# Patient Record
Sex: FEMALE | Race: WHITE
[De-identification: names, ages, dates, MRNs, and addresses within clinical notes are randomized per-mention and may not be internally consistent; named-entity substitution may affect disease eponyms.]

---

## 2018-07-24 ENCOUNTER — HOSPITAL ENCOUNTER (OUTPATIENT)
Dept: HOSPITAL 8 - LDOP | Age: 23
Discharge: HOME | End: 2018-07-24
Attending: OBSTETRICS & GYNECOLOGY
Payer: MEDICAID

## 2018-07-24 VITALS — WEIGHT: 207.23 LBS | HEIGHT: 63 IN | BODY MASS INDEX: 36.72 KG/M2

## 2018-07-24 DIAGNOSIS — Z34.83: Primary | ICD-10-CM

## 2018-07-24 DIAGNOSIS — O30.043: Primary | ICD-10-CM

## 2018-07-24 DIAGNOSIS — O23.43: ICD-10-CM

## 2018-07-24 DIAGNOSIS — Z3A.30: ICD-10-CM

## 2018-07-24 LAB
CLUE CELLS: PRESENT
T VAGINALIS RRNA GENITAL QL PROBE: (no result)
WET PREP WBCS: (no result)

## 2018-07-24 PROCEDURE — 87808 TRICHOMONAS ASSAY W/OPTIC: CPT

## 2018-07-24 PROCEDURE — 99211 OFF/OP EST MAY X REQ PHY/QHP: CPT

## 2018-07-24 PROCEDURE — 59025 FETAL NON-STRESS TEST: CPT

## 2018-07-24 PROCEDURE — 87210 SMEAR WET MOUNT SALINE/INK: CPT

## 2018-07-24 PROCEDURE — 80307 DRUG TEST PRSMV CHEM ANLYZR: CPT

## 2018-07-24 PROCEDURE — G0463 HOSPITAL OUTPT CLINIC VISIT: HCPCS

## 2018-07-24 PROCEDURE — 99201: CPT

## 2018-08-11 ENCOUNTER — HOSPITAL ENCOUNTER (INPATIENT)
Dept: HOSPITAL 8 - LDOP | Age: 23
LOS: 1 days | Discharge: HOME | DRG: 781 | End: 2018-08-12
Attending: OBSTETRICS & GYNECOLOGY | Admitting: OBSTETRICS & GYNECOLOGY
Payer: MEDICAID

## 2018-08-11 VITALS — BODY MASS INDEX: 36.93 KG/M2 | WEIGHT: 208.43 LBS | HEIGHT: 63 IN

## 2018-08-11 VITALS — DIASTOLIC BLOOD PRESSURE: 71 MMHG | SYSTOLIC BLOOD PRESSURE: 125 MMHG

## 2018-08-11 DIAGNOSIS — O30.043: ICD-10-CM

## 2018-08-11 DIAGNOSIS — O12.03: ICD-10-CM

## 2018-08-11 DIAGNOSIS — O99.820: Primary | ICD-10-CM

## 2018-08-11 DIAGNOSIS — Z3A.32: ICD-10-CM

## 2018-08-11 LAB
ALBUMIN SERPL-MCNC: 2.5 G/DL (ref 3.4–5)
ALP SERPL-CCNC: 331 U/L (ref 45–117)
ALT SERPL-CCNC: 15 U/L (ref 12–78)
ANION GAP SERPL CALC-SCNC: 9 MMOL/L (ref 5–15)
BASOPHILS # BLD AUTO: 0.14 X10^3/UL (ref 0–0.1)
BASOPHILS NFR BLD AUTO: 1 % (ref 0–1)
BILIRUB SERPL-MCNC: 0.4 MG/DL (ref 0.2–1)
CALCIUM SERPL-MCNC: 8.9 MG/DL (ref 8.5–10.1)
CHLORIDE SERPL-SCNC: 110 MMOL/L (ref 98–107)
CREAT SERPL-MCNC: 0.37 MG/DL (ref 0.55–1.02)
EOSINOPHIL # BLD AUTO: 0.07 X10^3/UL (ref 0–0.4)
EOSINOPHIL NFR BLD AUTO: 0 % (ref 1–7)
ERYTHROCYTE [DISTWIDTH] IN BLOOD BY AUTOMATED COUNT: 13.5 % (ref 9.6–15.2)
HBV CORE IGM SERPL QL IA: 8 MG/DL (ref 0–12)
HBV SURFACE AB SER RIA-ACNC: 41 MG/DL
LYMPHOCYTES # BLD AUTO: 2.16 X10^3/UL (ref 1–3.4)
LYMPHOCYTES NFR BLD AUTO: 14 % (ref 22–44)
MCH RBC QN AUTO: 32.9 PG (ref 27–34.8)
MCHC RBC AUTO-ENTMCNC: 33.8 G/DL (ref 32.4–35.8)
MCV RBC AUTO: 97.3 FL (ref 80–100)
MD: NO
MICROSCOPIC: (no result)
MONOCYTES # BLD AUTO: 1.03 X10^3/UL (ref 0.2–0.8)
MONOCYTES NFR BLD AUTO: 7 % (ref 2–9)
NEUTROPHILS # BLD AUTO: 11.67 X10^3/UL (ref 1.8–6.8)
NEUTROPHILS NFR BLD AUTO: 78 % (ref 42–75)
PLATELET # BLD AUTO: 220 X10^3/UL (ref 130–400)
PMV BLD AUTO: 8.2 FL (ref 7.4–10.4)
PROT SERPL-MCNC: 6.1 G/DL (ref 6.4–8.2)
RBC # BLD AUTO: 3.78 X10^6/UL (ref 3.82–5.3)

## 2018-08-11 PROCEDURE — 82248 BILIRUBIN DIRECT: CPT

## 2018-08-11 PROCEDURE — 86900 BLOOD TYPING SEROLOGIC ABO: CPT

## 2018-08-11 PROCEDURE — 82731 ASSAY OF FETAL FIBRONECTIN: CPT

## 2018-08-11 PROCEDURE — 80307 DRUG TEST PRSMV CHEM ANLYZR: CPT

## 2018-08-11 PROCEDURE — 86850 RBC ANTIBODY SCREEN: CPT

## 2018-08-11 PROCEDURE — 84550 ASSAY OF BLOOD/URIC ACID: CPT

## 2018-08-11 PROCEDURE — 82570 ASSAY OF URINE CREATININE: CPT

## 2018-08-11 PROCEDURE — 36415 COLL VENOUS BLD VENIPUNCTURE: CPT

## 2018-08-11 PROCEDURE — 81003 URINALYSIS AUTO W/O SCOPE: CPT

## 2018-08-11 PROCEDURE — 85025 COMPLETE CBC W/AUTO DIFF WBC: CPT

## 2018-08-11 PROCEDURE — 84156 ASSAY OF PROTEIN URINE: CPT

## 2018-08-11 PROCEDURE — 87081 CULTURE SCREEN ONLY: CPT

## 2018-08-11 PROCEDURE — 87086 URINE CULTURE/COLONY COUNT: CPT

## 2018-08-11 PROCEDURE — 76815 OB US LIMITED FETUS(S): CPT

## 2018-08-11 PROCEDURE — 87147 CULTURE TYPE IMMUNOLOGIC: CPT

## 2018-08-11 PROCEDURE — 80053 COMPREHEN METABOLIC PANEL: CPT

## 2018-08-11 RX ADMIN — BETAMETHASONE SODIUM PHOSPHATE AND BETAMETHASONE ACETATE SCH MG: 3; 3 INJECTION, SUSPENSION INTRA-ARTICULAR; INTRALESIONAL; INTRAMUSCULAR at 15:13

## 2018-08-12 RX ADMIN — BETAMETHASONE SODIUM PHOSPHATE AND BETAMETHASONE ACETATE SCH MG: 3; 3 INJECTION, SUSPENSION INTRA-ARTICULAR; INTRALESIONAL; INTRAMUSCULAR at 14:42

## 2018-08-30 ENCOUNTER — HOSPITAL ENCOUNTER (OUTPATIENT)
Dept: HOSPITAL 8 - LDIP | Age: 23
Setting detail: OBSERVATION
Discharge: HOME | End: 2018-08-30
Attending: OBSTETRICS & GYNECOLOGY | Admitting: OBSTETRICS & GYNECOLOGY
Payer: MEDICAID

## 2018-08-30 VITALS — BODY MASS INDEX: 37.5 KG/M2 | HEIGHT: 63 IN | WEIGHT: 211.64 LBS

## 2018-08-30 VITALS — SYSTOLIC BLOOD PRESSURE: 123 MMHG | DIASTOLIC BLOOD PRESSURE: 70 MMHG

## 2018-08-30 DIAGNOSIS — Z3A.35: ICD-10-CM

## 2018-08-30 DIAGNOSIS — F12.90: ICD-10-CM

## 2018-08-30 DIAGNOSIS — O99.323: Primary | ICD-10-CM

## 2018-08-30 DIAGNOSIS — Z79.899: ICD-10-CM

## 2018-08-30 PROCEDURE — G0378 HOSPITAL OBSERVATION PER HR: HCPCS

## 2018-08-30 PROCEDURE — 99211 OFF/OP EST MAY X REQ PHY/QHP: CPT

## 2018-08-30 PROCEDURE — 59025 FETAL NON-STRESS TEST: CPT

## 2018-08-30 PROCEDURE — 80307 DRUG TEST PRSMV CHEM ANLYZR: CPT

## 2018-08-30 PROCEDURE — G0463 HOSPITAL OUTPT CLINIC VISIT: HCPCS

## 2018-08-30 PROCEDURE — 76819 FETAL BIOPHYS PROFIL W/O NST: CPT

## 2018-09-13 ENCOUNTER — HOSPITAL ENCOUNTER (OUTPATIENT)
Dept: HOSPITAL 8 - LDOP | Age: 23
Discharge: HOME | End: 2018-09-13
Attending: OBSTETRICS & GYNECOLOGY
Payer: MEDICAID

## 2018-09-13 VITALS — DIASTOLIC BLOOD PRESSURE: 61 MMHG | SYSTOLIC BLOOD PRESSURE: 138 MMHG

## 2018-09-13 VITALS — WEIGHT: 221.45 LBS | BODY MASS INDEX: 39.24 KG/M2 | HEIGHT: 63 IN

## 2018-09-13 DIAGNOSIS — Z3A.37: ICD-10-CM

## 2018-09-13 DIAGNOSIS — O36.8130: Primary | ICD-10-CM

## 2018-09-13 PROCEDURE — 80307 DRUG TEST PRSMV CHEM ANLYZR: CPT

## 2018-09-13 PROCEDURE — 76815 OB US LIMITED FETUS(S): CPT

## 2018-09-13 PROCEDURE — 59025 FETAL NON-STRESS TEST: CPT

## 2018-09-13 PROCEDURE — 99211 OFF/OP EST MAY X REQ PHY/QHP: CPT

## 2018-09-13 PROCEDURE — G0463 HOSPITAL OUTPT CLINIC VISIT: HCPCS

## 2018-09-17 ENCOUNTER — HOSPITAL ENCOUNTER (INPATIENT)
Dept: HOSPITAL 8 - LDIP | Age: 23
LOS: 5 days | Discharge: HOME | End: 2018-09-22
Attending: OBSTETRICS & GYNECOLOGY | Admitting: OBSTETRICS & GYNECOLOGY
Payer: MEDICAID

## 2018-09-17 VITALS — WEIGHT: 225.31 LBS | HEIGHT: 63 IN | BODY MASS INDEX: 39.92 KG/M2

## 2018-09-17 VITALS — SYSTOLIC BLOOD PRESSURE: 125 MMHG | DIASTOLIC BLOOD PRESSURE: 78 MMHG

## 2018-09-17 VITALS — SYSTOLIC BLOOD PRESSURE: 128 MMHG | DIASTOLIC BLOOD PRESSURE: 86 MMHG

## 2018-09-17 DIAGNOSIS — O30.043: Primary | ICD-10-CM

## 2018-09-17 DIAGNOSIS — Z3A.38: ICD-10-CM

## 2018-09-17 DIAGNOSIS — O41.1230: ICD-10-CM

## 2018-09-17 LAB
BASOPHILS # BLD AUTO: 0.14 X10^3/UL (ref 0–0.1)
BASOPHILS NFR BLD AUTO: 1 % (ref 0–1)
EOSINOPHIL # BLD AUTO: 0.02 X10^3/UL (ref 0–0.4)
EOSINOPHIL NFR BLD AUTO: 0 % (ref 1–7)
ERYTHROCYTE [DISTWIDTH] IN BLOOD BY AUTOMATED COUNT: 13.8 % (ref 9.6–15.2)
LYMPHOCYTES # BLD AUTO: 1.76 X10^3/UL (ref 1–3.4)
LYMPHOCYTES NFR BLD AUTO: 15 % (ref 22–44)
MCH RBC QN AUTO: 33.8 PG (ref 27–34.8)
MCHC RBC AUTO-ENTMCNC: 34.5 G/DL (ref 32.4–35.8)
MCV RBC AUTO: 97.8 FL (ref 80–100)
MD: NO
MONOCYTES # BLD AUTO: 0.7 X10^3/UL (ref 0.2–0.8)
MONOCYTES NFR BLD AUTO: 6 % (ref 2–9)
NEUTROPHILS # BLD AUTO: 9.38 X10^3/UL (ref 1.8–6.8)
NEUTROPHILS NFR BLD AUTO: 78 % (ref 42–75)
PLATELET # BLD AUTO: 168 X10^3/UL (ref 130–400)
PMV BLD AUTO: 8.3 FL (ref 7.4–10.4)
RBC # BLD AUTO: 3.62 X10^6/UL (ref 3.82–5.3)

## 2018-09-17 PROCEDURE — 85025 COMPLETE CBC W/AUTO DIFF WBC: CPT

## 2018-09-17 PROCEDURE — 82803 BLOOD GASES ANY COMBINATION: CPT

## 2018-09-17 PROCEDURE — 83605 ASSAY OF LACTIC ACID: CPT

## 2018-09-17 PROCEDURE — 87040 BLOOD CULTURE FOR BACTERIA: CPT

## 2018-09-17 PROCEDURE — 80053 COMPREHEN METABOLIC PANEL: CPT

## 2018-09-17 PROCEDURE — 86850 RBC ANTIBODY SCREEN: CPT

## 2018-09-17 PROCEDURE — 86900 BLOOD TYPING SEROLOGIC ABO: CPT

## 2018-09-17 PROCEDURE — 90715 TDAP VACCINE 7 YRS/> IM: CPT

## 2018-09-17 PROCEDURE — 80307 DRUG TEST PRSMV CHEM ANLYZR: CPT

## 2018-09-17 PROCEDURE — 89060 EXAM SYNOVIAL FLUID CRYSTALS: CPT

## 2018-09-17 PROCEDURE — 36415 COLL VENOUS BLD VENIPUNCTURE: CPT

## 2018-09-17 RX ADMIN — PENICILLIN G POTASSIUM SCH MLS/HR: 5000000 POWDER, FOR SOLUTION INTRAMUSCULAR; INTRAPLEURAL; INTRATHECAL; INTRAVENOUS at 22:27

## 2018-09-17 RX ADMIN — SODIUM CHLORIDE, SODIUM LACTATE, POTASSIUM CHLORIDE, CALCIUM CHLORIDE, AND DEXTROSE MONOHYDRATE SCH MLS/HR: 600; 310; 30; 20; 5 INJECTION, SOLUTION INTRAVENOUS at 17:43

## 2018-09-17 RX ADMIN — SODIUM CHLORIDE, SODIUM LACTATE, POTASSIUM CHLORIDE, AND CALCIUM CHLORIDE SCH MLS/HR: .6; .31; .03; .02 INJECTION, SOLUTION INTRAVENOUS at 17:35

## 2018-09-18 VITALS — DIASTOLIC BLOOD PRESSURE: 77 MMHG | SYSTOLIC BLOOD PRESSURE: 121 MMHG

## 2018-09-18 RX ADMIN — SODIUM CHLORIDE, SODIUM LACTATE, POTASSIUM CHLORIDE, CALCIUM CHLORIDE, AND DEXTROSE MONOHYDRATE SCH MLS/HR: 600; 310; 30; 20; 5 INJECTION, SOLUTION INTRAVENOUS at 12:34

## 2018-09-18 RX ADMIN — PENICILLIN G POTASSIUM SCH MLS/HR: 5000000 POWDER, FOR SOLUTION INTRAMUSCULAR; INTRAPLEURAL; INTRATHECAL; INTRAVENOUS at 10:40

## 2018-09-18 RX ADMIN — PENICILLIN G POTASSIUM SCH MLS/HR: 5000000 POWDER, FOR SOLUTION INTRAMUSCULAR; INTRAPLEURAL; INTRATHECAL; INTRAVENOUS at 06:18

## 2018-09-18 RX ADMIN — PENICILLIN G POTASSIUM SCH MLS/HR: 5000000 POWDER, FOR SOLUTION INTRAMUSCULAR; INTRAPLEURAL; INTRATHECAL; INTRAVENOUS at 14:33

## 2018-09-18 RX ADMIN — SODIUM CHLORIDE, SODIUM LACTATE, POTASSIUM CHLORIDE, AND CALCIUM CHLORIDE SCH MLS/HR: .6; .31; .03; .02 INJECTION, SOLUTION INTRAVENOUS at 08:49

## 2018-09-18 RX ADMIN — SODIUM CHLORIDE, SODIUM LACTATE, POTASSIUM CHLORIDE, AND CALCIUM CHLORIDE SCH MLS/HR: .6; .31; .03; .02 INJECTION, SOLUTION INTRAVENOUS at 01:43

## 2018-09-18 RX ADMIN — SODIUM CHLORIDE, SODIUM LACTATE, POTASSIUM CHLORIDE, AND CALCIUM CHLORIDE SCH MLS/HR: .6; .31; .03; .02 INJECTION, SOLUTION INTRAVENOUS at 07:30

## 2018-09-18 RX ADMIN — FENTANYL CITRATE PRN MCG: 50 INJECTION INTRAMUSCULAR; INTRAVENOUS at 04:29

## 2018-09-18 RX ADMIN — PENICILLIN G POTASSIUM SCH MLS/HR: 5000000 POWDER, FOR SOLUTION INTRAMUSCULAR; INTRAPLEURAL; INTRATHECAL; INTRAVENOUS at 02:17

## 2018-09-18 RX ADMIN — SODIUM CHLORIDE, SODIUM LACTATE, POTASSIUM CHLORIDE, AND CALCIUM CHLORIDE SCH MLS/HR: .6; .31; .03; .02 INJECTION, SOLUTION INTRAVENOUS at 02:20

## 2018-09-18 RX ADMIN — FENTANYL CITRATE PRN MCG: 50 INJECTION INTRAMUSCULAR; INTRAVENOUS at 00:42

## 2018-09-18 RX ADMIN — SODIUM CHLORIDE, SODIUM LACTATE, POTASSIUM CHLORIDE, CALCIUM CHLORIDE, AND DEXTROSE MONOHYDRATE SCH MLS/HR: 600; 310; 30; 20; 5 INJECTION, SOLUTION INTRAVENOUS at 01:43

## 2018-09-18 RX ADMIN — FENTANYL CITRATE PRN MCG: 50 INJECTION INTRAMUSCULAR; INTRAVENOUS at 06:13

## 2018-09-18 RX ADMIN — PENICILLIN G POTASSIUM SCH MLS/HR: 5000000 POWDER, FOR SOLUTION INTRAMUSCULAR; INTRAPLEURAL; INTRATHECAL; INTRAVENOUS at 18:21

## 2018-09-19 VITALS — DIASTOLIC BLOOD PRESSURE: 88 MMHG | SYSTOLIC BLOOD PRESSURE: 134 MMHG

## 2018-09-19 VITALS — DIASTOLIC BLOOD PRESSURE: 83 MMHG | SYSTOLIC BLOOD PRESSURE: 136 MMHG

## 2018-09-19 VITALS — DIASTOLIC BLOOD PRESSURE: 83 MMHG | SYSTOLIC BLOOD PRESSURE: 125 MMHG

## 2018-09-19 VITALS — DIASTOLIC BLOOD PRESSURE: 77 MMHG | SYSTOLIC BLOOD PRESSURE: 116 MMHG

## 2018-09-19 VITALS — DIASTOLIC BLOOD PRESSURE: 86 MMHG | SYSTOLIC BLOOD PRESSURE: 126 MMHG

## 2018-09-19 LAB
BASOPHILS # BLD AUTO: 0 X10^3/UL (ref 0–0.1)
BASOPHILS NFR BLD AUTO: 0 % (ref 0–1)
EOSINOPHIL # BLD AUTO: 0 X10^3/UL (ref 0–0.4)
EOSINOPHIL NFR BLD AUTO: 0 % (ref 1–7)
ERYTHROCYTE [DISTWIDTH] IN BLOOD BY AUTOMATED COUNT: 13.9 % (ref 9.6–15.2)
LYMPHOCYTES # BLD AUTO: 1.28 X10^3/UL (ref 1–3.4)
LYMPHOCYTES NFR BLD AUTO: 6 % (ref 22–44)
MCH RBC QN AUTO: 32 PG (ref 27–34.8)
MCHC RBC AUTO-ENTMCNC: 32.8 G/DL (ref 32.4–35.8)
MCV RBC AUTO: 97.7 FL (ref 80–100)
MD: (no result)
MONOCYTES # BLD AUTO: 1.16 X10^3/UL (ref 0.2–0.8)
MONOCYTES NFR BLD AUTO: 6 % (ref 2–9)
NEUTROPHILS # BLD AUTO: 17.52 X10^3/UL (ref 1.8–6.8)
NEUTROPHILS NFR BLD AUTO: 88 % (ref 42–75)
PLATELET # BLD AUTO: 201 X10^3/UL (ref 130–400)
PMV BLD AUTO: 8.3 FL (ref 7.4–10.4)
RBC # BLD AUTO: 3.29 X10^6/UL (ref 3.82–5.3)

## 2018-09-19 RX ADMIN — DOCUSATE SODIUM PRN MG: 100 CAPSULE, LIQUID FILLED ORAL at 21:08

## 2018-09-19 RX ADMIN — OXYCODONE HYDROCHLORIDE AND ACETAMINOPHEN PRN TAB: 5; 325 TABLET ORAL at 15:38

## 2018-09-19 RX ADMIN — OXYCODONE HYDROCHLORIDE AND ACETAMINOPHEN PRN TAB: 5; 325 TABLET ORAL at 12:09

## 2018-09-19 RX ADMIN — OXYCODONE HYDROCHLORIDE AND ACETAMINOPHEN PRN TAB: 5; 325 TABLET ORAL at 21:08

## 2018-09-19 RX ADMIN — OXYCODONE HYDROCHLORIDE AND ACETAMINOPHEN PRN TAB: 5; 325 TABLET ORAL at 08:27

## 2018-09-19 RX ADMIN — SODIUM CHLORIDE, SODIUM LACTATE, POTASSIUM CHLORIDE, AND CALCIUM CHLORIDE SCH MLS/HR: .6; .31; .03; .02 INJECTION, SOLUTION INTRAVENOUS at 16:03

## 2018-09-19 RX ADMIN — SODIUM CHLORIDE, SODIUM LACTATE, POTASSIUM CHLORIDE, AND CALCIUM CHLORIDE SCH MLS/HR: .6; .31; .03; .02 INJECTION, SOLUTION INTRAVENOUS at 00:03

## 2018-09-19 RX ADMIN — KETOROLAC TROMETHAMINE SCH MG: 30 INJECTION, SOLUTION INTRAMUSCULAR at 06:12

## 2018-09-19 RX ADMIN — KETOROLAC TROMETHAMINE SCH MG: 30 INJECTION, SOLUTION INTRAMUSCULAR at 19:07

## 2018-09-19 RX ADMIN — Medication SCH MLS/HR: at 20:03

## 2018-09-19 RX ADMIN — DOCUSATE SODIUM PRN MG: 100 CAPSULE, LIQUID FILLED ORAL at 08:27

## 2018-09-19 RX ADMIN — Medication SCH MLS/HR: at 00:03

## 2018-09-19 RX ADMIN — SODIUM CHLORIDE, SODIUM LACTATE, POTASSIUM CHLORIDE, AND CALCIUM CHLORIDE SCH MLS/HR: .6; .31; .03; .02 INJECTION, SOLUTION INTRAVENOUS at 10:03

## 2018-09-19 RX ADMIN — PRENATAL VIT W/ FE FUMARATE-FA TAB 27-0.8 MG SCH EACH: 27-0.8 TAB at 08:27

## 2018-09-19 RX ADMIN — KETOROLAC TROMETHAMINE SCH MG: 30 INJECTION, SOLUTION INTRAMUSCULAR at 12:09

## 2018-09-19 RX ADMIN — SODIUM CHLORIDE, SODIUM LACTATE, POTASSIUM CHLORIDE, AND CALCIUM CHLORIDE SCH MLS/HR: .6; .31; .03; .02 INJECTION, SOLUTION INTRAVENOUS at 20:03

## 2018-09-19 RX ADMIN — SODIUM CHLORIDE, SODIUM LACTATE, POTASSIUM CHLORIDE, AND CALCIUM CHLORIDE SCH MLS/HR: .6; .31; .03; .02 INJECTION, SOLUTION INTRAVENOUS at 08:03

## 2018-09-19 RX ADMIN — KETOROLAC TROMETHAMINE SCH MG: 30 INJECTION, SOLUTION INTRAMUSCULAR at 00:30

## 2018-09-19 RX ADMIN — Medication SCH MLS/HR: at 10:03

## 2018-09-20 VITALS — DIASTOLIC BLOOD PRESSURE: 83 MMHG | SYSTOLIC BLOOD PRESSURE: 127 MMHG

## 2018-09-20 VITALS — SYSTOLIC BLOOD PRESSURE: 111 MMHG | DIASTOLIC BLOOD PRESSURE: 68 MMHG

## 2018-09-20 VITALS — DIASTOLIC BLOOD PRESSURE: 86 MMHG | SYSTOLIC BLOOD PRESSURE: 134 MMHG

## 2018-09-20 VITALS — DIASTOLIC BLOOD PRESSURE: 76 MMHG | SYSTOLIC BLOOD PRESSURE: 114 MMHG

## 2018-09-20 LAB
ALBUMIN SERPL-MCNC: 1.9 G/DL (ref 3.4–5)
ALP SERPL-CCNC: 360 U/L (ref 45–117)
ALT SERPL-CCNC: 9 U/L (ref 12–78)
ANION GAP SERPL CALC-SCNC: 11 MMOL/L (ref 5–15)
BASOPHILS # BLD AUTO: 0.02 X10^3/UL (ref 0–0.1)
BASOPHILS NFR BLD AUTO: 0 % (ref 0–1)
BILIRUB SERPL-MCNC: 0.5 MG/DL (ref 0.2–1)
CALCIUM SERPL-MCNC: 8.2 MG/DL (ref 8.5–10.1)
CHLORIDE SERPL-SCNC: 112 MMOL/L (ref 98–107)
CREAT SERPL-MCNC: 0.68 MG/DL (ref 0.55–1.02)
EOSINOPHIL # BLD AUTO: 0.01 X10^3/UL (ref 0–0.4)
EOSINOPHIL NFR BLD AUTO: 0 % (ref 1–7)
ERYTHROCYTE [DISTWIDTH] IN BLOOD BY AUTOMATED COUNT: 14.4 % (ref 9.6–15.2)
LYMPHOCYTES # BLD AUTO: 1.12 X10^3/UL (ref 1–3.4)
LYMPHOCYTES NFR BLD AUTO: 7 % (ref 22–44)
MCH RBC QN AUTO: 33.5 PG (ref 27–34.8)
MCHC RBC AUTO-ENTMCNC: 34.8 G/DL (ref 32.4–35.8)
MCV RBC AUTO: 96.3 FL (ref 80–100)
MD: (no result)
MONOCYTES # BLD AUTO: 0.92 X10^3/UL (ref 0.2–0.8)
MONOCYTES NFR BLD AUTO: 6 % (ref 2–9)
NEUTROPHILS # BLD AUTO: 13.29 X10^3/UL (ref 1.8–6.8)
NEUTROPHILS NFR BLD AUTO: 87 % (ref 42–75)
PLATELET # BLD AUTO: 206 X10^3/UL (ref 130–400)
PMV BLD AUTO: 7.6 FL (ref 7.4–10.4)
PROT SERPL-MCNC: 5.3 G/DL (ref 6.4–8.2)
RBC # BLD AUTO: 2.62 X10^6/UL (ref 3.82–5.3)

## 2018-09-20 RX ADMIN — OXYCODONE HYDROCHLORIDE AND ACETAMINOPHEN PRN TAB: 5; 325 TABLET ORAL at 18:41

## 2018-09-20 RX ADMIN — Medication SCH MLS/HR: at 16:03

## 2018-09-20 RX ADMIN — AMPICILLIN SODIUM SCH MLS/HR: 2 INJECTION, POWDER, FOR SOLUTION INTRAMUSCULAR; INTRAVENOUS at 13:21

## 2018-09-20 RX ADMIN — OXYCODONE HYDROCHLORIDE AND ACETAMINOPHEN PRN TAB: 5; 325 TABLET ORAL at 01:38

## 2018-09-20 RX ADMIN — KETOROLAC TROMETHAMINE SCH MG: 30 INJECTION, SOLUTION INTRAMUSCULAR at 19:56

## 2018-09-20 RX ADMIN — SODIUM CHLORIDE, SODIUM LACTATE, POTASSIUM CHLORIDE, AND CALCIUM CHLORIDE SCH MLS/HR: .6; .31; .03; .02 INJECTION, SOLUTION INTRAVENOUS at 00:03

## 2018-09-20 RX ADMIN — AMPICILLIN SODIUM SCH MLS/HR: 2 INJECTION, POWDER, FOR SOLUTION INTRAMUSCULAR; INTRAVENOUS at 19:57

## 2018-09-20 RX ADMIN — OXYCODONE HYDROCHLORIDE AND ACETAMINOPHEN PRN TAB: 5; 325 TABLET ORAL at 12:22

## 2018-09-20 RX ADMIN — DOCUSATE SODIUM PRN MG: 100 CAPSULE, LIQUID FILLED ORAL at 19:57

## 2018-09-20 RX ADMIN — CLINDAMYCIN IN 5 PERCENT DEXTROSE SCH MLS/HR: 18 INJECTION, SOLUTION INTRAVENOUS at 20:39

## 2018-09-20 RX ADMIN — GENTAMICIN SULFATE SCH MLS/HR: 40 INJECTION, SOLUTION INTRAMUSCULAR; INTRAVENOUS at 15:06

## 2018-09-20 RX ADMIN — Medication SCH MLS/HR: at 06:03

## 2018-09-20 RX ADMIN — OXYCODONE HYDROCHLORIDE AND ACETAMINOPHEN PRN TAB: 5; 325 TABLET ORAL at 06:13

## 2018-09-20 RX ADMIN — SODIUM CHLORIDE, SODIUM LACTATE, POTASSIUM CHLORIDE, AND CALCIUM CHLORIDE SCH MLS/HR: .6; .31; .03; .02 INJECTION, SOLUTION INTRAVENOUS at 06:03

## 2018-09-20 RX ADMIN — SODIUM CHLORIDE, SODIUM LACTATE, POTASSIUM CHLORIDE, AND CALCIUM CHLORIDE SCH MLS/HR: .6; .31; .03; .02 INJECTION, SOLUTION INTRAVENOUS at 08:03

## 2018-09-20 RX ADMIN — KETOROLAC TROMETHAMINE SCH MG: 30 INJECTION, SOLUTION INTRAMUSCULAR at 06:36

## 2018-09-20 RX ADMIN — SODIUM CHLORIDE, SODIUM LACTATE, POTASSIUM CHLORIDE, AND CALCIUM CHLORIDE SCH MLS/HR: .6; .31; .03; .02 INJECTION, SOLUTION INTRAVENOUS at 20:30

## 2018-09-20 RX ADMIN — KETOROLAC TROMETHAMINE SCH MG: 30 INJECTION, SOLUTION INTRAMUSCULAR at 13:04

## 2018-09-20 RX ADMIN — SODIUM CHLORIDE, SODIUM LACTATE, POTASSIUM CHLORIDE, AND CALCIUM CHLORIDE SCH MLS/HR: .6; .31; .03; .02 INJECTION, SOLUTION INTRAVENOUS at 12:31

## 2018-09-20 RX ADMIN — PRENATAL VIT W/ FE FUMARATE-FA TAB 27-0.8 MG SCH EACH: 27-0.8 TAB at 09:00

## 2018-09-20 RX ADMIN — KETOROLAC TROMETHAMINE SCH MG: 30 INJECTION, SOLUTION INTRAMUSCULAR at 00:59

## 2018-09-20 RX ADMIN — CLINDAMYCIN IN 5 PERCENT DEXTROSE SCH MLS/HR: 18 INJECTION, SOLUTION INTRAVENOUS at 13:58

## 2018-09-21 VITALS — SYSTOLIC BLOOD PRESSURE: 109 MMHG | DIASTOLIC BLOOD PRESSURE: 76 MMHG

## 2018-09-21 VITALS — SYSTOLIC BLOOD PRESSURE: 115 MMHG | DIASTOLIC BLOOD PRESSURE: 72 MMHG

## 2018-09-21 VITALS — SYSTOLIC BLOOD PRESSURE: 109 MMHG | DIASTOLIC BLOOD PRESSURE: 73 MMHG

## 2018-09-21 VITALS — SYSTOLIC BLOOD PRESSURE: 115 MMHG | DIASTOLIC BLOOD PRESSURE: 77 MMHG

## 2018-09-21 VITALS — SYSTOLIC BLOOD PRESSURE: 113 MMHG | DIASTOLIC BLOOD PRESSURE: 70 MMHG

## 2018-09-21 VITALS — SYSTOLIC BLOOD PRESSURE: 119 MMHG | DIASTOLIC BLOOD PRESSURE: 78 MMHG

## 2018-09-21 LAB
BASOPHILS # BLD AUTO: 0.02 X10^3/UL (ref 0–0.1)
BASOPHILS NFR BLD AUTO: 0 % (ref 0–1)
EOSINOPHIL # BLD AUTO: 0.03 X10^3/UL (ref 0–0.4)
EOSINOPHIL NFR BLD AUTO: 0 % (ref 1–7)
ERYTHROCYTE [DISTWIDTH] IN BLOOD BY AUTOMATED COUNT: 14.5 % (ref 9.6–15.2)
LYMPHOCYTES # BLD AUTO: 1.29 X10^3/UL (ref 1–3.4)
LYMPHOCYTES NFR BLD AUTO: 10 % (ref 22–44)
MCH RBC QN AUTO: 33.2 PG (ref 27–34.8)
MCHC RBC AUTO-ENTMCNC: 34.2 G/DL (ref 32.4–35.8)
MCV RBC AUTO: 96.9 FL (ref 80–100)
MD: NO
MONOCYTES # BLD AUTO: 0.83 X10^3/UL (ref 0.2–0.8)
MONOCYTES NFR BLD AUTO: 6 % (ref 2–9)
NEUTROPHILS # BLD AUTO: 10.83 X10^3/UL (ref 1.8–6.8)
NEUTROPHILS NFR BLD AUTO: 83 % (ref 42–75)
PLATELET # BLD AUTO: 215 X10^3/UL (ref 130–400)
PMV BLD AUTO: 7.2 FL (ref 7.4–10.4)
RBC # BLD AUTO: 2.42 X10^6/UL (ref 3.82–5.3)

## 2018-09-21 RX ADMIN — PRENATAL VIT W/ FE FUMARATE-FA TAB 27-0.8 MG SCH EACH: 27-0.8 TAB at 07:45

## 2018-09-21 RX ADMIN — DOCUSATE SODIUM PRN MG: 100 CAPSULE, LIQUID FILLED ORAL at 21:55

## 2018-09-21 RX ADMIN — IBUPROFEN PRN MG: 600 TABLET ORAL at 02:17

## 2018-09-21 RX ADMIN — SODIUM CHLORIDE, SODIUM LACTATE, POTASSIUM CHLORIDE, AND CALCIUM CHLORIDE SCH MLS/HR: .6; .31; .03; .02 INJECTION, SOLUTION INTRAVENOUS at 04:17

## 2018-09-21 RX ADMIN — GENTAMICIN SULFATE SCH MLS/HR: 40 INJECTION, SOLUTION INTRAMUSCULAR; INTRAVENOUS at 16:02

## 2018-09-21 RX ADMIN — CLINDAMYCIN IN 5 PERCENT DEXTROSE SCH MLS/HR: 18 INJECTION, SOLUTION INTRAVENOUS at 04:12

## 2018-09-21 RX ADMIN — IBUPROFEN PRN MG: 600 TABLET ORAL at 21:55

## 2018-09-21 RX ADMIN — IBUPROFEN PRN MG: 600 TABLET ORAL at 16:04

## 2018-09-21 RX ADMIN — AMPICILLIN SODIUM SCH MLS/HR: 2 INJECTION, POWDER, FOR SOLUTION INTRAMUSCULAR; INTRAVENOUS at 07:45

## 2018-09-21 RX ADMIN — OXYCODONE HYDROCHLORIDE AND ACETAMINOPHEN PRN TAB: 5; 325 TABLET ORAL at 21:55

## 2018-09-21 RX ADMIN — OXYCODONE HYDROCHLORIDE AND ACETAMINOPHEN PRN TAB: 5; 325 TABLET ORAL at 07:44

## 2018-09-21 RX ADMIN — AMPICILLIN SODIUM SCH MLS/HR: 2 INJECTION, POWDER, FOR SOLUTION INTRAMUSCULAR; INTRAVENOUS at 13:56

## 2018-09-21 RX ADMIN — AMPICILLIN SODIUM SCH MLS/HR: 2 INJECTION, POWDER, FOR SOLUTION INTRAMUSCULAR; INTRAVENOUS at 19:34

## 2018-09-21 RX ADMIN — CLINDAMYCIN IN 5 PERCENT DEXTROSE SCH MLS/HR: 18 INJECTION, SOLUTION INTRAVENOUS at 20:14

## 2018-09-21 RX ADMIN — Medication SCH MLS/HR: at 12:03

## 2018-09-21 RX ADMIN — Medication SCH MLS/HR: at 02:03

## 2018-09-21 RX ADMIN — AMPICILLIN SODIUM SCH MLS/HR: 2 INJECTION, POWDER, FOR SOLUTION INTRAMUSCULAR; INTRAVENOUS at 02:17

## 2018-09-21 RX ADMIN — SODIUM CHLORIDE, SODIUM LACTATE, POTASSIUM CHLORIDE, AND CALCIUM CHLORIDE SCH MLS/HR: .6; .31; .03; .02 INJECTION, SOLUTION INTRAVENOUS at 20:30

## 2018-09-21 RX ADMIN — IBUPROFEN PRN MG: 600 TABLET ORAL at 07:44

## 2018-09-21 RX ADMIN — OXYCODONE HYDROCHLORIDE AND ACETAMINOPHEN PRN TAB: 5; 325 TABLET ORAL at 16:04

## 2018-09-21 RX ADMIN — CLINDAMYCIN IN 5 PERCENT DEXTROSE SCH MLS/HR: 18 INJECTION, SOLUTION INTRAVENOUS at 12:18

## 2018-09-21 RX ADMIN — Medication SCH MLS/HR: at 22:03

## 2018-09-21 RX ADMIN — SODIUM CHLORIDE, SODIUM LACTATE, POTASSIUM CHLORIDE, AND CALCIUM CHLORIDE SCH MLS/HR: .6; .31; .03; .02 INJECTION, SOLUTION INTRAVENOUS at 12:30

## 2018-09-21 RX ADMIN — DOCUSATE SODIUM PRN MG: 100 CAPSULE, LIQUID FILLED ORAL at 07:45

## 2018-09-22 VITALS — SYSTOLIC BLOOD PRESSURE: 117 MMHG | DIASTOLIC BLOOD PRESSURE: 77 MMHG

## 2018-09-22 VITALS — SYSTOLIC BLOOD PRESSURE: 120 MMHG | DIASTOLIC BLOOD PRESSURE: 81 MMHG

## 2018-09-22 VITALS — SYSTOLIC BLOOD PRESSURE: 127 MMHG | DIASTOLIC BLOOD PRESSURE: 72 MMHG

## 2018-09-22 LAB
BASOPHILS # BLD AUTO: 0.04 X10^3/UL (ref 0–0.1)
BASOPHILS NFR BLD AUTO: 0 % (ref 0–1)
EOSINOPHIL # BLD AUTO: 0.08 X10^3/UL (ref 0–0.4)
EOSINOPHIL NFR BLD AUTO: 1 % (ref 1–7)
ERYTHROCYTE [DISTWIDTH] IN BLOOD BY AUTOMATED COUNT: 14.3 % (ref 9.6–15.2)
LYMPHOCYTES # BLD AUTO: 1.36 X10^3/UL (ref 1–3.4)
LYMPHOCYTES NFR BLD AUTO: 13 % (ref 22–44)
MCH RBC QN AUTO: 32.8 PG (ref 27–34.8)
MCHC RBC AUTO-ENTMCNC: 33.5 G/DL (ref 32.4–35.8)
MCV RBC AUTO: 98 FL (ref 80–100)
MD: NO
MONOCYTES # BLD AUTO: 0.77 X10^3/UL (ref 0.2–0.8)
MONOCYTES NFR BLD AUTO: 7 % (ref 2–9)
NEUTROPHILS # BLD AUTO: 8.25 X10^3/UL (ref 1.8–6.8)
NEUTROPHILS NFR BLD AUTO: 79 % (ref 42–75)
PLATELET # BLD AUTO: 268 X10^3/UL (ref 130–400)
PMV BLD AUTO: 7 FL (ref 7.4–10.4)
RBC # BLD AUTO: 2.51 X10^6/UL (ref 3.82–5.3)

## 2018-09-22 RX ADMIN — AMPICILLIN SODIUM SCH MLS/HR: 2 INJECTION, POWDER, FOR SOLUTION INTRAMUSCULAR; INTRAVENOUS at 01:41

## 2018-09-22 RX ADMIN — DOCUSATE SODIUM PRN MG: 100 CAPSULE, LIQUID FILLED ORAL at 09:30

## 2018-09-22 RX ADMIN — IBUPROFEN PRN MG: 600 TABLET ORAL at 09:30

## 2018-09-22 RX ADMIN — AMPICILLIN SODIUM SCH MLS/HR: 2 INJECTION, POWDER, FOR SOLUTION INTRAMUSCULAR; INTRAVENOUS at 07:49

## 2018-09-22 RX ADMIN — CLINDAMYCIN IN 5 PERCENT DEXTROSE SCH MLS/HR: 18 INJECTION, SOLUTION INTRAVENOUS at 04:07

## 2018-09-22 RX ADMIN — SODIUM CHLORIDE, SODIUM LACTATE, POTASSIUM CHLORIDE, AND CALCIUM CHLORIDE SCH MLS/HR: .6; .31; .03; .02 INJECTION, SOLUTION INTRAVENOUS at 04:30

## 2018-09-22 RX ADMIN — OXYCODONE HYDROCHLORIDE AND ACETAMINOPHEN PRN TAB: 5; 325 TABLET ORAL at 03:42

## 2018-09-22 RX ADMIN — PRENATAL VIT W/ FE FUMARATE-FA TAB 27-0.8 MG SCH EACH: 27-0.8 TAB at 09:30

## 2018-09-22 RX ADMIN — OXYCODONE HYDROCHLORIDE AND ACETAMINOPHEN PRN TAB: 5; 325 TABLET ORAL at 15:25

## 2018-09-22 RX ADMIN — Medication SCH MLS/HR: at 08:03

## 2018-09-22 RX ADMIN — OXYCODONE HYDROCHLORIDE AND ACETAMINOPHEN PRN TAB: 5; 325 TABLET ORAL at 08:36

## 2018-09-22 RX ADMIN — IBUPROFEN PRN MG: 600 TABLET ORAL at 15:25

## 2018-09-22 RX ADMIN — IBUPROFEN PRN MG: 600 TABLET ORAL at 03:42

## 2018-09-22 RX ADMIN — SODIUM CHLORIDE, SODIUM LACTATE, POTASSIUM CHLORIDE, AND CALCIUM CHLORIDE SCH MLS/HR: .6; .31; .03; .02 INJECTION, SOLUTION INTRAVENOUS at 12:30

## 2019-02-25 ENCOUNTER — HOSPITAL ENCOUNTER (EMERGENCY)
Dept: HOSPITAL 8 - ED | Age: 24
Discharge: HOME | End: 2019-02-25
Payer: MEDICAID

## 2019-02-25 VITALS — HEIGHT: 63 IN | WEIGHT: 164.02 LBS | BODY MASS INDEX: 29.06 KG/M2

## 2019-02-25 VITALS — DIASTOLIC BLOOD PRESSURE: 73 MMHG | SYSTOLIC BLOOD PRESSURE: 121 MMHG

## 2019-02-25 DIAGNOSIS — L04.2: ICD-10-CM

## 2019-02-25 DIAGNOSIS — L02.411: Primary | ICD-10-CM

## 2019-02-25 LAB
ALBUMIN SERPL-MCNC: 3.8 G/DL (ref 3.4–5)
ANION GAP SERPL CALC-SCNC: 5 MMOL/L (ref 5–15)
BASOPHILS # BLD AUTO: 0.08 X10^3/UL (ref 0–0.1)
BASOPHILS NFR BLD AUTO: 1 % (ref 0–1)
CALCIUM SERPL-MCNC: 9.1 MG/DL (ref 8.5–10.1)
CHLORIDE SERPL-SCNC: 104 MMOL/L (ref 98–107)
CREAT SERPL-MCNC: 0.65 MG/DL (ref 0.55–1.02)
EOSINOPHIL # BLD AUTO: 0 X10^3/UL (ref 0–0.4)
EOSINOPHIL NFR BLD AUTO: 0 % (ref 1–7)
ERYTHROCYTE [DISTWIDTH] IN BLOOD BY AUTOMATED COUNT: 16 % (ref 9.6–15.2)
LYMPHOCYTES # BLD AUTO: 1.8 X10^3/UL (ref 1–3.4)
LYMPHOCYTES NFR BLD AUTO: 14 % (ref 22–44)
MCH RBC QN AUTO: 28.9 PG (ref 27–34.8)
MCHC RBC AUTO-ENTMCNC: 33.4 G/DL (ref 32.4–35.8)
MCV RBC AUTO: 86.4 FL (ref 80–100)
MD: NO
MONOCYTES # BLD AUTO: 1.37 X10^3/UL (ref 0.2–0.8)
MONOCYTES NFR BLD AUTO: 11 % (ref 2–9)
NEUTROPHILS # BLD AUTO: 9.75 X10^3/UL (ref 1.8–6.8)
NEUTROPHILS NFR BLD AUTO: 75 % (ref 42–75)
PLATELET # BLD AUTO: 302 X10^3/UL (ref 130–400)
PMV BLD AUTO: 7.9 FL (ref 7.4–10.4)
RBC # BLD AUTO: 4.9 X10^6/UL (ref 3.82–5.3)

## 2019-02-25 PROCEDURE — 99284 EMERGENCY DEPT VISIT MOD MDM: CPT

## 2019-02-25 PROCEDURE — 36415 COLL VENOUS BLD VENIPUNCTURE: CPT

## 2019-02-25 PROCEDURE — 10061 I&D ABSCESS COMP/MULTIPLE: CPT

## 2019-02-25 PROCEDURE — 96372 THER/PROPH/DIAG INJ SC/IM: CPT

## 2019-02-25 PROCEDURE — 71046 X-RAY EXAM CHEST 2 VIEWS: CPT

## 2019-02-25 PROCEDURE — 82040 ASSAY OF SERUM ALBUMIN: CPT

## 2019-02-25 PROCEDURE — 80048 BASIC METABOLIC PNL TOTAL CA: CPT

## 2019-02-25 PROCEDURE — 85025 COMPLETE CBC W/AUTO DIFF WBC: CPT

## 2019-02-25 PROCEDURE — 76881 US COMPL JOINT R-T W/IMG: CPT

## 2019-02-25 NOTE — NUR
pt to ed for multiple palpable masses wtih erythema and tenderness to right 
armpit. 1 large abscess drained 4 days ago. accompanied by mother. pa 
assessment complete. orders received. medicated per mar. labs drawn. xray 
complete. awaitin julieth and results.

## 2019-02-28 ENCOUNTER — HOSPITAL ENCOUNTER (EMERGENCY)
Dept: HOSPITAL 8 - ED | Age: 24
Discharge: HOME | End: 2019-02-28
Payer: MEDICAID

## 2019-02-28 VITALS — WEIGHT: 165.35 LBS | BODY MASS INDEX: 29.3 KG/M2 | HEIGHT: 63 IN

## 2019-02-28 VITALS — SYSTOLIC BLOOD PRESSURE: 104 MMHG | DIASTOLIC BLOOD PRESSURE: 66 MMHG

## 2019-02-28 DIAGNOSIS — F17.200: ICD-10-CM

## 2019-02-28 DIAGNOSIS — L02.411: Primary | ICD-10-CM

## 2019-02-28 PROCEDURE — 90715 TDAP VACCINE 7 YRS/> IM: CPT

## 2019-02-28 PROCEDURE — 10060 I&D ABSCESS SIMPLE/SINGLE: CPT

## 2019-02-28 PROCEDURE — 90471 IMMUNIZATION ADMIN: CPT

## 2019-02-28 PROCEDURE — 99283 EMERGENCY DEPT VISIT LOW MDM: CPT

## 2019-02-28 NOTE — NUR
AFTER I&D, PT HAS 4 OPEN WOUNDS WITH PACKING IN EACH. DRESSING APPLIED TO 
AXILLARY AREA WITH ADAPTIC, ABD PAD, SILK TAPE. D/C INSTRUCTIONS, RX, & F/U 
APPT RV'WD WITH PT AND MOTHER, THEY VERBALIZE UNDERSTANDING. PT AMBULATED OUT 
OF ED WITHOUT DIFFICULTY.

## 2020-07-16 ENCOUNTER — HOSPITAL ENCOUNTER (EMERGENCY)
Dept: HOSPITAL 8 - ED | Age: 25
Discharge: HOME | End: 2020-07-16
Payer: MEDICAID

## 2020-07-16 VITALS — DIASTOLIC BLOOD PRESSURE: 72 MMHG | SYSTOLIC BLOOD PRESSURE: 110 MMHG

## 2020-07-16 VITALS — HEIGHT: 63 IN | WEIGHT: 152.78 LBS | BODY MASS INDEX: 27.07 KG/M2

## 2020-07-16 DIAGNOSIS — L02.411: Primary | ICD-10-CM

## 2020-07-16 PROCEDURE — 99283 EMERGENCY DEPT VISIT LOW MDM: CPT

## 2020-07-16 PROCEDURE — 96372 THER/PROPH/DIAG INJ SC/IM: CPT

## 2020-07-16 PROCEDURE — 10060 I&D ABSCESS SIMPLE/SINGLE: CPT
